# Patient Record
Sex: FEMALE | Race: WHITE | NOT HISPANIC OR LATINO | URBAN - NONMETROPOLITAN AREA
[De-identification: names, ages, dates, MRNs, and addresses within clinical notes are randomized per-mention and may not be internally consistent; named-entity substitution may affect disease eponyms.]

---

## 2019-04-18 ENCOUNTER — IMPORTED ENCOUNTER (OUTPATIENT)
Dept: URBAN - NONMETROPOLITAN AREA CLINIC 1 | Facility: CLINIC | Age: 79
End: 2019-04-18

## 2019-04-18 PROBLEM — H16.223: Noted: 2019-04-18

## 2019-04-18 PROBLEM — H10.45: Noted: 2019-04-18

## 2019-04-18 PROBLEM — H27.03: Noted: 2019-04-18

## 2019-04-18 PROBLEM — H35.361: Noted: 2019-04-18

## 2019-04-18 PROCEDURE — 92012 INTRM OPH EXAM EST PATIENT: CPT

## 2019-04-18 NOTE — PATIENT DISCUSSION
allergic conj ODEDUCATE PT ON FINDINGSMONITORSTART PATANOL BID OUMACULAR DRUSEN ODPROGRESSION ODEDUCATE PTMONITORKEEP [facility] APPTAphakic OU. RGP CTL Rx given for CTL's. Dry Eyes OU. Continue Refresh OU BID+. Recommend increase frequency of artificial tears OU with increased dry eye symptoms. 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

## 2020-05-18 ENCOUNTER — IMPORTED ENCOUNTER (OUTPATIENT)
Dept: URBAN - NONMETROPOLITAN AREA CLINIC 1 | Facility: CLINIC | Age: 80
End: 2020-05-18

## 2020-05-18 PROBLEM — H16.223: Noted: 2020-05-18

## 2020-05-18 PROBLEM — H35.361: Noted: 2020-05-18

## 2020-05-18 PROBLEM — H27.03: Noted: 2020-05-18

## 2020-05-18 PROCEDURE — 92310 CONTACT LENS FITTING OU: CPT

## 2020-05-18 PROCEDURE — 92014 COMPRE OPH EXAM EST PT 1/>: CPT

## 2020-05-18 PROCEDURE — 92015 DETERMINE REFRACTIVE STATE: CPT

## 2020-05-18 NOTE — PATIENT DISCUSSION
MACULAR DRUSEN ODPROGRESSION ODEDUCATE PTMONITORKEEP [facility] APPTAphakic OU. RGP CTL Rx given for CTL's. Dry Eyes OU. Continue Refresh OU BID+. Recommend increase frequency of artificial tears OU with increased dry eye symptoms. 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

## 2020-11-16 ENCOUNTER — IMPORTED ENCOUNTER (OUTPATIENT)
Dept: URBAN - NONMETROPOLITAN AREA CLINIC 1 | Facility: CLINIC | Age: 80
End: 2020-11-16

## 2020-11-16 PROBLEM — H27.03: Noted: 2020-11-16

## 2020-11-16 PROBLEM — T15.12XA: Noted: 2020-11-16

## 2020-11-16 PROBLEM — H35.361: Noted: 2020-11-16

## 2020-11-16 PROBLEM — H16.223: Noted: 2020-11-16

## 2020-11-16 PROCEDURE — 99213 OFFICE O/P EST LOW 20 MIN: CPT

## 2020-11-16 NOTE — PATIENT DISCUSSION
Foreign Body Sensation OS - discussed diagnosis in detail w/ patient - patients RGP contact lens was under eyelid on lid eversion - repositioned contact lens today for patient - recommmend using [place] solution on contact when patient gets home

## 2021-03-11 ENCOUNTER — IMPORTED ENCOUNTER (OUTPATIENT)
Dept: URBAN - NONMETROPOLITAN AREA CLINIC 1 | Facility: CLINIC | Age: 81
End: 2021-03-11

## 2021-03-11 PROBLEM — H35.361: Noted: 2021-03-11

## 2021-03-11 PROBLEM — H27.03: Noted: 2021-03-11

## 2021-03-11 PROBLEM — H16.223: Noted: 2021-03-11

## 2021-03-11 PROCEDURE — 99212 OFFICE O/P EST SF 10 MIN: CPT

## 2021-03-11 NOTE — PATIENT DISCUSSION
Foreign Body Sensation OS- discussed diagnosis in detail w/ patient - discussed signs and symptoms associated - recommend using artificial tears throughout the day w/ her contacts - no FB noted on lid eversion - looked over patients contacts today; both lenses are intact - I think dryness is the source of her irritation. - Samples of Refresh Relieva given in office today - 2 months - Complete Eye Exam w/ DFE

## 2022-04-09 ASSESSMENT — TONOMETRY
OS_IOP_MMHG: 19
OD_IOP_MMHG: 21
OS_IOP_MMHG: 18
OS_IOP_MMHG: 21
OD_IOP_MMHG: 19
OD_IOP_MMHG: 18

## 2022-04-09 ASSESSMENT — VISUAL ACUITY
OS_SC: 20/20
OS_SC: 20/30
OD_SC: 20/40-2
OS_SC: 20/25
OS_CC: J1
OS_SC: 20/30
OD_CC: J1
OD_SC: 20/25
OD_SC: 20/50
OD_CC: CF2'

## 2022-10-12 ENCOUNTER — COMPREHENSIVE EXAM (OUTPATIENT)
Dept: RURAL CLINIC 1 | Facility: CLINIC | Age: 82
End: 2022-10-12

## 2022-10-12 DIAGNOSIS — H16.223: ICD-10-CM

## 2022-10-12 DIAGNOSIS — H27.03: ICD-10-CM

## 2022-10-12 DIAGNOSIS — H35.361: ICD-10-CM

## 2022-10-12 PROCEDURE — 92310-E CONTACT LENS FITTING ESTABLISH PATIENT

## 2022-10-12 PROCEDURE — 92015 DETERMINE REFRACTIVE STATE: CPT

## 2022-10-12 PROCEDURE — 92014 COMPRE OPH EXAM EST PT 1/>: CPT

## 2022-10-12 ASSESSMENT — TONOMETRY
OD_IOP_MMHG: 18
OS_IOP_MMHG: 18

## 2022-10-12 NOTE — PATIENT DISCUSSION
Foreign Body Sensation OS- discussed diagnosis in detail w/ patient - discussed signs and symptoms associated - recommend using artificial tears throughout the day w/ her contacts - no FB noted on lid eversion - looked over patients contacts today; both lenses are intact - I think dryness is the source of her irritation. - Samples of Refresh Relieva given in office today - 2 months - Complete Eye Exam w/ DFE.

## 2024-09-18 ENCOUNTER — COMPREHENSIVE EXAM (OUTPATIENT)
Dept: RURAL CLINIC 1 | Facility: CLINIC | Age: 84
End: 2024-09-18

## 2024-09-18 DIAGNOSIS — H33.001: ICD-10-CM

## 2024-09-18 DIAGNOSIS — H27.03: ICD-10-CM

## 2024-09-18 DIAGNOSIS — H16.223: ICD-10-CM

## 2024-09-18 DIAGNOSIS — H35.361: ICD-10-CM

## 2024-09-18 PROCEDURE — 92014 COMPRE OPH EXAM EST PT 1/>: CPT

## 2024-11-21 ENCOUNTER — FOLLOW UP (OUTPATIENT)
Dept: RURAL CLINIC 1 | Facility: CLINIC | Age: 84
End: 2024-11-21

## 2024-11-21 DIAGNOSIS — H27.03: ICD-10-CM

## 2024-11-21 PROCEDURE — 92015 DETERMINE REFRACTIVE STATE: CPT | Mod: NC
